# Patient Record
(demographics unavailable — no encounter records)

---

## 2024-12-07 NOTE — HISTORY OF PRESENT ILLNESS
[FreeTextEntry1] : Dec 06, 2024    in person.    Her son has DM1, is onc  fellow at [place], [place]    Next Prolia Jan 27, 2025 at [place].    PCP: Dr. Yolanda Briceño at [facility] -> echocardiogram               GI: Dr. Felipa Millan (colon polyp)              Hematology:   Dr. Thomas Newsome  - [facility] in [place]               Periodontist:  Dr. Trena Valles - [address]  ref her to ->              ENT at [facility] - was supposed to see Dr. Fracisco Lopez  before pandemic began for oral pain            .            CC: Osteopenia w/ presumptive rib fracture;  X-ray [place] 2020: negative rib films  (spontaneous)                          hx of Actonel in past for ~ 5 years                Prolia #1  Jan 25, 2021                   2.5 cm R thyroid nodule - FNAB WPA: benign 2020             (MGUS) 2/2022 [place]   IgM kappa    M spike 0.9 g/dl (4/2021) - sees Dr. Newsome             (scoliosis: X-ray [place] 11/2018 neg compression;  2020 can't r/o L3)             (spinal stenosis)  Receives epidurals at [facility]             (mother had osteoporosis)   76 yo visits for osteoporosis, thyroid nodule and kindly brings in most recent [facility] note and labs from Dr. Mercedez Mcgill advised her A1c slightly elevated. She feels well.    # Thyroid nodule - most recent US at [place] 11/29/22:  "IMPRESSION:   Asymmetrically enlarged heterogeneous thyroid.  Dominant 2.4 cm left-sided TIRAD 3 nodule and additional subcentimeter nodules as above.  Advise follow-up thyroid ultrasound in one year."  Labs by Dr. Newsome were done in June -    Booster for Covid 19 was  Dec 1, 2021.   Received Prolia #1 in Jan and received r Prolia #3 in January   #4  ~8/25/2022   [place] labs 11/28/22 vit D 52   (will have updated vit D today)  : : Constitutional:  Alert, well nourished, healthy appearance, no acute distress  Eyes:  No proptosis, no stare Thyroid: Pulmonary:  No respiratory distress, no accessory muscle use; normal rate and effort Cardiac:  Normal rate Vascular:  Endocrine:  No stigmata of Cushings Syndrome Musculoskeletal:  Normal gait, no involuntary movements Neurology:  No tremors Affect/Mood/Psych:  Oriented x 3; normal affect, normal insight/judgement, normal mood  . Impression:  Doing well on Prolia for osteoporosis. Thyroid nodule remains under surveillance.       Jun 05, 2024    in person   neighbor of Dr. GILBERT  PCP: Dr. Yolanda Briceño at [facility] -> echocardiogram               GI: Dr. Felipa Millan (colon polyp)              Hematology:   Dr. Thomas Newsome  - [facility] in [place]               Periodontist:  Dr. Trena Valles - [address]  ref her to ->              ENT at [facility] - was supposed to see Dr. Fracisco Lopez  before pandemic began for oral pain            .            CC: Osteopenia w/ presumptive rib fracture;  X-ray [facility] 2020: negative rib films  (spontaneous)                          hx of Actonel in past for ~ 5 years                Prolia #1  Jan 25, 2021                   2.5 cm R thyroid nodule - FNAB WPA: benign 2020             (MGUS) 2/2022 [place]   IgM kappa    M spike 0.9 g/dl (4/2021) - sees Dr. Newsome             (scoliosis: X-ray [place] 11/2018 neg compression;  2020 can't r/o L3)             (spinal stenosis)  Receives epidurals at [facility]             (mother had osteoporosis)   76 yo kindly brings note from Dr. Yolanda Briceño from 1/4/2024   # Thyroid nodule -US at [place] 11/29/22:    (7/2024:  Interval stability    "IMPRESSION:   Asymmetrically enlarged heterogeneous thyroid.  Dominant 2.4 cm left-sided TIRAD 3 nodule and additional subcentimeter nodules as above.  Advise follow-up thyroid ultrasound in one year."  Labs by Dr. Newsome were done in June -    Booster for Covid 19 was  Dec 1, 2021.   Received Prolia #1 in Jan and received r Prolia #3 in January   #4  ~8/25/2022   [place] labs 11/28/22 vit D 52  : : Constitutional:  Alert, well nourished, healthy appearance, no acute distress  Eyes:  No proptosis, no stare Thyroid: normal to palpatin Pulmonary:  No respiratory distress, no accessory muscle use; normal rate and effort Cardiac:  Normal rate Vascular:  Endocrine:  No stigmata of Cushings Syndrome Musculoskeletal:  Normal gait, no involuntary movements Neurology:  No tremors Affect/Mood/Psych:  Oriented x 3; normal affect, normal insight/judgement, normal mood  . Imp/Plan: Follow up US thyroid at [place] to monitor dominant nodule. Continue Prola. Labs today. ROV January Jan 05, 2024     in person  PCP: Dr. Yolanda Briceño at [facility]              GI: Dr. Felipa Millan (colon polyp)              Hematology:   Dr. Thomas Newsome  - [facility] in [place]               Periodontist:  Dr. Trena Valles - [address]  ref her to ->              ENT at [facility] - was supposed to see Dr. Fracisco Lopez  before pandemic began for oral pain            .            CC: Osteopenia w/ presumptive rib fracture;  X-ray [place] 2020: negative rib films  (spontaneous)                          hx of Actonel in past for ~ 5 years                Prolia #1  Jan 25, 2021                   2.5 cm R thyroid nodule - FNAB WPA: benign 2020             (MGUS) 2/2022 [place]   IgM kappa    M spike 0.9 g/dl (4/2021) - sees Dr. Newsome             (scoliosis: X-ray [place] 11/2018 neg compression;  2020 can't r/o L3)             (spinal stenosis)  Receives epidurals at [facility]             (mother had osteoporosis)   76 yo kindly brings note from Dr. Yolanda Briceño from 1/4/2024   # Thyroid nodule - most recent US at [place] 11/29/22:  "IMPRESSION:   Asymmetrically enlarged heterogeneous thyroid.  Dominant 2.4 cm left-sided TIRAD 3 nodule and additional subcentimeter nodules as above.  Advise follow-up thyroid ultrasound in one year."  Labs by Dr. Newsome were done in June -    Booster for Covid 19 was  Dec 1, 2021.   Received Prolia #1 in Jan and received r Prolia #3 in January   #4  ~8/25/2022   [place] labs 11/28/22 vit D 52  : : Constitutional:  Alert, well nourished, healthy appearance, no acute distress  Eyes:  No proptosis, no stare Thyroid: Pulmonary:  No respiratory distress, no accessory muscle use; normal rate and effort Cardiac:  Normal rate Vascular:  Endocrine:  No stigmata of Cushings Syndrome Musculoskeletal:  Normal gait, no involuntary movements Neurology:  No tremors Affect/Mood/Psych:  Oriented x 3; normal affect, normal insight/judgement, normal mood  . Imp:  On Prolia for osteoporosis .  Next Prolia Jan 19, 2024  Plan:  Vit D, BMP today         May 22, 2023    in person  PCP: Dr. Yolanda Briceño at [facility]              GI: Dr. Felipa Millan (colon polyp)              Hematology:   Dr. Thomas Newsome  - [facility] in [place]               Periodontist:  Dr. Trena Valles - [address]  ref her to ->              ENT at [facility] - was supposed to see Dr. Fracisco Lopez  before pandemic began for oral pain            .            CC: Osteopenia w/ presumptive rib fracture;  X-ray [facility] 2020: negative rib films  (spontaneous)                          hx of Actonel in past for ~ 5 years                Prolia #1  Jan 25, 2021                   2.5 cm R thyroid nodule - FNAB WPA: benign 2020             (MGUS) 2/2022 [place]   IgM kappa    M spike 0.9 g/dl (4/2021) - sees Dr. Newsome             (scoliosis: X-ray [place] 11/2018 neg compression;  2020 can't r/o L3)             (spinal stenosis)  Receives epidurals at [facility]             (mother had osteoporosis)   # Thyroid nodule - most recent US at [place] 11/29/22:  "IMPRESSION:   Asymmetrically enlarged heterogeneous thyroid.  Dominant 2.4 cm left-sided TIRAD 3 nodule and additional subcentimeter nodules as above.  Advise follow-up thyroid ultrasound in one year."  Labs by Dr. Newsome were done in June -    Booster for Covid 19 was  Dec 1, 2021.   Received Prolia #1 in Jan and received r Prolia #3 in January   #4  ~8/25/2022   [place] labs 11/28/22 vit D 52  Most recent Prolia injection was Nov 29, 2022   : : Constitutional:  Alert, well nourished, healthy appearance, no acute distress  Eyes:  No proptosis, no stare Thyroid:  normal to palpation Pulmonary:  No respiratory distress, no accessory muscle use; normal rate and effort Cardiac:  Normal rate Vascular:  Endocrine:  No stigmata of Cushing's Syndrome Musculoskeletal:  Normal gait, no involuntary movements Neurology:  No tremors Affect/Mood/Psych:  Oriented x 3; normal affect, normal insight/judgement, normal mood  .      Imp:  Due for next Prolia injection end of May, but she will be on trip until June 7, 2024 so she will schedule next Prolia for after June 7 and go for labs shortly before that.       Jan 17, 2023      iPhone     reviewed US thy (prev bx), x-ray spine, bone density, Noe notes MGUS, next Prolia  PCP: Dr. Yolanda Briceño at [facility]              GI: Dr. Felipa Millan (colon polyp)              Hematology:   Dr. Thomas Newsome  - [facility] in [place]               Periodontist:  Dr. Trena Valles - [address]  ref her to ->              ENT at [facility] - was supposed to see Dr. Fracisco Lopez  before pandemic began for oral pain            .            CC: Osteopenia w/ presumptive rib fracture;  X-ray [facility] 2020: negative rib films  (spontaneous)                          hx of Actonel in past for ~ 5 years                Prolia #1  Jan 25, 2021                   2.5 cm R thyroid nodule - FNAB WPA: benign 2020             (MGUS) 2/2022 [facility]   IgM kappa    M spike 0.9 g/dl (4/2021) - sees Dr. Newsome             (scoliosis: X-ray [facility] 11/2018 neg compression;  2020 can't r/o L3)             (spinal stenosis)  Receives epidurals at [facility]             (mother had osteoporosis)   Labs by Dr. Newsome were done in June -    Booster for Covid 19 was  Dec 1, 2021.   Received Prolia #1 in Jan and received r Prolia #3 in January   #4  ~8/25/2022   [facility] labs 11/28/22 vit D 52   Impression:  Tolerating Prolia without problem Bone density results and trajectory reviewed. X-ray: mild exaggeration of the normal thoracic kyphosis related to disc space narrowing.    Plan:  Continue Prolia for now.      Nov 28, 2022     in person  PCP: Dr. Yolanda Briceño at [facility]              GI: Dr. Felipa Millan (colon polyp)              Hematology:   Dr. Thomas Newsome  - [facility] in [place]               Periodontist:  Dr. Trena Valles - [address]  ref her to ->              ENT at [facility] - was supposed to see Dr. Fracisco Lopez  before pandemic began for oral pain            .            CC: Osteopenia w/ presumptive rib fracture;  X-ray [facility] 2020: negative rib films  (spontaneous)                          hx of Actonel in past for ~ 5 years                Prolia #1  Jan 25, 2021                   2.5 cm R thyroid nodule - FNAB WPA: benign 2020             (MGUS) 2/2022 [facility]   IgM kappa    M spike 11.1 %             (scoliosis: X-ray [facility] 11/2018 neg compression;  2020 can't r/o L3)             (spinal stenosis)  Receives epidurals at [facility]             (mother had osteoporosis)   Labs by Dr. Newsome were done in June -    Booster for Covid 19 was  Dec 1, 2021.   Received Prolia #1 in Jan and received r Prolia #3 in January   #4  ~8/25/2022   Sees Dr. Newsome every 3 months for MGUS.  Taking caltrate 600 + D once a day Centrum silver vit D 2000 iu (50 mcg) daily) Prolia every 6 months. - at [place] as ordered by .     Apr 26, 2022     in person  PCP: Dr. Yolanda Briceño at [facility]              GI: Dr. Felipa Millan (colon polyp)              Hematology:   Dr. Thomas Newsome  - [facility] in [place]               Periodontist:  Dr. Trena Valles - [address]  ref her to ->              ENT at [facility] - was supposed to see Dr. Fracisco Lopez  before pandemic began for oral pain            .            CC: Osteopenia w/ presumptive rib fracture;  X-ray [facility] 2020: negative rib films  (spontaneous)                          hx of Actonel in past for ~ 5 years                Prolia #1  Jan 25, 2021                   2.5 cm R thyroid nodule - FNAB WPA: benign 2020             (MGUS) 2/2022 [facility]   IgM kappa    M spike 11.1 %             (scoliosis: X-ray [facility] 11/2018 neg compression;  2020 can't r/o L3)             (spinal stenosis)  Receives epidurals at [facility]             (mother had osteoporosis)   Labs by Dr. Newsome were done in June -    Booster for Covid 19 was  Dec 1, 2021.   Received Prolia #1 in Jan and received r Prolia #3 in January   #4  ~8/25/2022   Sees Dr. Newsome every 3 months forr MGUS.  : : Constitutional:  Alert, well nourished, healthy appearance, no acute distress  Eyes:  No proptosis, no stare Thyroid: Pulmonary:  No respiratory distress, no accessory muscle use; normal rate and effort Cardiac:  Normal rate Vascular:  Endocrine:  No stigmata of Cushing's Syndrome Musculoskeletal:  Normal gait, no involuntary movements Neurology:  No tremors Affect/Mood/Psych:  Oriented x 3; normal affect, normal insight/judgement, normal mood  .  Impression:   MGUS and fragile bones on Actonel and then Prolia since January 2021 under supervision  of Dr. Newsome.   Striving to get 1000 mg of calcium a day and to maintain 25 OH vitamin D level in range of 30-40  Plan:  Updated vitamin D and other labs today and ROV six months       Nov 15, 2021     [place]well                         Prolia #1  Jan 25, 2021      PCP: Dr. Ronald Daniels p [phone] at [facility] ->  Yolanda Briceño at [facility] also             GI: Dr. Felipa Millan (colon polyp)              Hematology:   Dr. Thomas Newsome                Periodontist:  Dr. Trena Valles - [address]  ref her to ->              ENT at [facility] - was supposed to see Dr. Fracisco Lopez  before pandemic began for oral pain            .            CC: Osteopenia w/ presumptive rib fracture;  X-ray [facility] 2020: negative rib films                          hx of Actonel in past for ~ 5 years                Prolia #1  Jan 25, 2021             (MGUS)             (scoliosis: X-ray [facility] 11/2018 neg compression;  2020 can't r/o L3)             (spinal stenosis)  Receives epidurals at [facility]             (mother had osteoporosis)   Labs by Dr. Newsome in June -                  Will get Moderna booster Dec 1, 2021.   Received Prolia #1 in Jan and will be due for Prolia #3 in January July vit D was 35    Impression:  May have had an L3 compression.   On Prolia.  Has tolerated it well.    Plan:  Updated labs when she goes for next Prolia at [place].        Jul 15, 2021        iPhone ...................smo  PCP: Dr. Ronald Daniels p [phone] at [facility] ->  Yolanda Briceño at [facility] also             GI: Dr. Felipa Millan (colon polyp)              Hematology:   Dr. Newsome              .            CC: Osteopenia w/ presumptive rib fracture;  X-ray [facility] 2020: negative rib films                          hx of Actonel in past for ~ 5 years              (scoliosis: X-ray [facility] 11/2018 neg compression;  2020 can't r/o L3)             (spinal stenosis)  Receives epidurals at [facility]             (mother had osteoporosis)   Labs by Dr. Newsome a month ago  Saw Dr. Yolanda Briceño yesterday - advised to lose weight    Impression:  It is time to receive Prolia injection. However, we need updated lab tests, including vitamin D level prior to the treatment.  Plan:  She will check with Dr. Newsome and Dr. Briceño to see if they did the vitamin D level. If not, she will stop by [place] to get it done. ROV November.      Last Prolia Jan 25 at [place]  Plan she will get us BMP, vit D or go to [place] and then I will schedule next Prolai a      Oct 28, 2020    iPhone    needs lab Rx  PCP: Dr. Ronald Daniels p [phone] at [facility] ->  Yolanda Briceño at [facility] also             GI: Dr. Felipa Millan (colon polyp)              Hematology:  Dr. Kaycee Baron            .            CC: Osteopenia w/ presumptive rib fracture;  X-ray [place] 2020: negative rib films                          hx of Actonel in past for ~ 5 years                     (MGUS)             (scoliosis: X-ray [place] 11/2018 neg compression;  2020 can't r/o L3)             (spinal stenosis)  Receives epidurals at [facility]             (mother had osteoporosis)     Recent X-ray  at [place]:  Impression: No acute displaced rib fracture   ***Electronically Signed *** ----------------------------------------------- VIVIEN MUIR MD              07/29/20   X-ray spine:  CLINICAL HISTORY: Osteoporosis, monoclonal gammopathy  FINDINGS:   Levoscoliosis centered on L1 is again apparent. Multilevel degenerative disc disease and productive changes are again apparent including L5-S1 disc space narrowing and mild anterolisthesis. Lateral projection due to the scoliotic deformity is limited in the evaluation of vertebral height. The L3 level cannot be adequately assessed but there decrease in height is suggested. Otherwise the vertebra are maintained in height shape and alignment.  IMPRESSION: Scoliotic deformity and diffuse degenerative changes and discogenic disease again noted. There may be interval loss in height of the L3 vertebral body.  ***Electronically Signed *** ----------------------------------------------- Bryan Hughes MD              10/06/20 0925  Dictated on 10/05/20   Imp/Plan: 71 yo being followed by Dr. Baron - mgus Rib no longer bothers her; 10/2020:  negative rib films   2020 bone density [place] T scores:  LS -0.9;  TH -1.7;  FN -2.3   Carries diagnosis of spinal stenosis, scoliosis Has scoliosis, ? L3 compression - hard to tell b/o the scoliosis - on recent X-ray spine (neg in 2018) Will see Dr. Baron soon  - vitamin D level has been in good range. Gets epidurals at [facility] - last several years ago.     ROV six months.   F/U on L3.    Depending on findings, follow up bone density, GFR, can be considered for an antiresorptive in future.  Jul 14, 2020     PCP: Dr. Ronald Daniels p [phone] at [facility] ->  Yolanda Briceño at [facility] also             GI: Dr. Felipa Millan (colon polyp)              Hematology:  Dr. Kaycee Baron            .            CC: Osteopenia w/ presumptive rib fracture   hx of Actonel in past for ~ 5 years              (scoliosis)             (spinal stenosis)  Receives epidurals at [facility]             (mother had osteoporosis)   Her son, DM1, PGY 2 at [employer].  Doing well but busy.  Her most recent bone density at [facility] at [address] [place] [address]  AP spine T 0 lateral spine T + 2.2 FN T -2.1 TH T -1.6  Impression:  Her usual facility for bone density has retired.  So next BD will be at [place] at very end of September or early October.    In the meantime, I will ask her to have X-ray spine (to check for occult compression fracture), and defer to Dr. Baron regarding any strategies for bone strength while evaluation of monoclonal protein continues.  She still has discomfort where she previously injured a rib, and I will ask her to have rib films when she goes for the spine X-ray, likely on the same day near end of July that she has Hematology follow up visit.  ROV here in October Jan 16, 2020  PCP: Dr. Ronald Daniels p [phone] at [facility] ->  Yolanda Briceño at [facility] also             GI: Dr. Felipa Millan (colon polyp)              Hematology:  Dr. Kaycee Baron            .            CC: Osteopenia w/ rib frx             (scoliosis)             (spinal stenosis)              (mother had osteoporosis)   Going for US thyroid at [facility] per Dr. Briceño.   Seeing Dr. Baron regarding monoclonal protein.  Haematologica. 2018 May; 103(5): 753-754. doi: [email or web address] PMCID: [id number] PMID: [id number] Denosumab for bone lesions in multiple myeloma - what is its value? Rafael Pate       Sept 3, 2019 PCP: Dr. Ronald Daniels p [phone] at [facility]              GI: Dr. Felipa Millan (colon polyp)            .            CC: Osteopenia w/ rib frx             (scoliosis)             (spinal stenosis)              (mother had osteoporosis)   72 yo lives in [place].   A close neighbor is a senior physician advisor.   Her son has Type 1 and is an intern in medicine at [employer].     Impression:  Evidence of MGUS.      Plan:   Before deciding on next steps for treatment of osteoporosis, she will see Hematology at [place]. Contact made and Hematology Department will contact her.    Kanwal Law   April 1, 2019   .            PCP: Dr. Ronald Daniels p [phone]             GI: Dr. Felipa Millan (colon polyp)            .            CC: Osteopenia w/ rib frx             (scoliosis)             (mother had osteoporosis)   In course of evaluation for rib fractures, noted to have osteopenia.   Lab studies show monoclonal IgM kappa.   Beta-2 microglobulin, ESR WNL   Plan:  Repeat labs at [facility] before next visit in Septamber - including markers of bone turnover. Will then ask advice of Hematology.                 .   January 2, 2018  CC:  Osteopenia, rib fracture  24 hour urine for calcium performed at [facility].- within normal limits.  Blood tests from  10/29/2018 performed at [place] included TSH 2.65 PTH 49 gliadin IgG/IgA - neg osteocalcin 19 CTXs  319 IgM Kappa Band + WBC 3.5 Polys 50 Lymphs 32 Monos 17  Hct 38  X-ray spine 11/1/2018 No fracture   Impression:   Rib fracture from minimal trauma; however, no major fractures or compression fractures. Not in a rush to restart pharmacologic intervention.  Plan:  Will follow up on recent labs, repeat CBC etc. ROV in several weeks and likely input from Dr. Baron or Faustino.      Previous notes from [employer] appended below.    October 26, 2018            .            PCP: Dr. Ronald Daniels p [phone]             GI: Dr. Felipa Millan (colon polyp)            .            CC: Osteoporosis             (mother had osteoporosis)             .            69 yo  mother of 29 yo son.            Retired .             Medications include            desloratadine 5 mg            diclofenac topical gel            ipratropium bromide 0.03 % nasal spray            Miralax poser            Nexium 20 mg apsule            resperidone 0.25 mg tabs            sertraline 100 mg tabs            simvastatin 40 mg daiy .            .            Told of PCOS in her 20s when she had painful menses.            Gyn was Dr. Harjeet Petty who asked her what temperature she preferred and she was referred to Dr. Gris Landis and was told of PCOS. She was treated with Motrin with benefit.             .            Has been getting bone density tests by Dr. Gris Landis. She was told of good bone density.            In 2013 the bone density was very good.             .            Recently told of osteopenia; however, she was advised that she is declining and her mother had osteoporosis.             .            She has also been told of spinal stenosis, scoliosis, and arthritis.             Goes for epidurals by Dr. Galloway at [facility] - for back pain about 3very 3 months with benefit. Last was over a year ago.             .            She treats the pain with extra strength Tyelonol, two BID with benefit.            The radiologist who performed the most recent bone density test at [address] [place] [address] noted she [id number] lost 16% over [email or web address] past 10 years.

## 2024-12-07 NOTE — HISTORY OF PRESENT ILLNESS
[FreeTextEntry1] : Dec 06, 2024    in person.    Her son has DM1, is onc  fellow at [place], [place]    Next Prolia Jan 27, 2025 at [place].    PCP: Dr. Yolanda Briceño at [facility] -> echocardiogram               GI: Dr. Felipa Millan (colon polyp)              Hematology:   Dr. Thomas Newsome  - [facility] in [place]               Periodontist:  Dr. Trena Valles - [address]  ref her to ->              ENT at [facility] - was supposed to see Dr. Fracisco Lopez  before pandemic began for oral pain            .            CC: Osteopenia w/ presumptive rib fracture;  X-ray [facility] 2020: negative rib films  (spontaneous)                          hx of Actonel in past for ~ 5 years                Prolia #1  Jan 25, 2021                   2.5 cm R thyroid nodule - FNAB WPA: benign 2020             (MGUS) 2/2022 [place]   IgM kappa    M spike 0.9 g/dl (4/2021) - sees Dr. Newsome             (scoliosis: X-ray [place] 11/2018 neg compression;  2020 can't r/o L3)             (spinal stenosis)  Receives epidurals at [facility]             (mother had osteoporosis)   74 yo visits for osteoporosis, thyroid nodule and kindly brings in most recent [facility] note and labs from Dr. Mercedez Mcgill advised her A1c slightly elevated. She feels well.    # Thyroid nodule - most recent US at [facility] 11/29/22:  "IMPRESSION:   Asymmetrically enlarged heterogeneous thyroid.  Dominant 2.4 cm left-sided TIRAD 3 nodule and additional subcentimeter nodules as above.  Advise follow-up thyroid ultrasound in one year."  Labs by Dr. Newsome were done in June -    Booster for Covid 19 was  Dec 1, 2021.   Received Prolia #1 in Jan and received r Prolia #3 in January   #4  ~8/25/2022   [facility] labs 11/28/22 vit D 52   (will have updated vit D today)  : : Constitutional:  Alert, well nourished, healthy appearance, no acute distress  Eyes:  No proptosis, no stare Thyroid: Pulmonary:  No respiratory distress, no accessory muscle use; normal rate and effort Cardiac:  Normal rate Vascular:  Endocrine:  No stigmata of Cushings Syndrome Musculoskeletal:  Normal gait, no involuntary movements Neurology:  No tremors Affect/Mood/Psych:  Oriented x 3; normal affect, normal insight/judgement, normal mood  . Impression:  Doing well on Prolia for osteoporosis. Thyroid nodule remains under surveillance.       Jun 05, 2024    in person   neighbor of Dr. GILBERT  PCP: Dr. Yolanda Briceño at [facility] -> echocardiogram               GI: Dr. Felipa Millan (colon polyp)              Hematology:   Dr. Thomas Newsome  - [facility] in [place]               Periodontist:  Dr. Trena Valles - [address]  ref her to ->              ENT at [facility] - was supposed to see Dr. Fracisco Lopez  before pandemic began for oral pain            .            CC: Osteopenia w/ presumptive rib fracture;  X-ray [facility] 2020: negative rib films  (spontaneous)                          hx of Actonel in past for ~ 5 years                Prolia #1  Jan 25, 2021                   2.5 cm R thyroid nodule - FNAB WPA: benign 2020             (MGUS) 2/2022 [place]   IgM kappa    M spike 0.9 g/dl (4/2021) - sees Dr. Newsome             (scoliosis: X-ray [facility] 11/2018 neg compression;  2020 can't r/o L3)             (spinal stenosis)  Receives epidurals at [facility]             (mother had osteoporosis)   74 yo kindly brings note from Dr. Yolanda Briceño from 1/4/2024   # Thyroid nodule -US at [place] 11/29/22:    (7/2024:  Interval stability    "IMPRESSION:   Asymmetrically enlarged heterogeneous thyroid.  Dominant 2.4 cm left-sided TIRAD 3 nodule and additional subcentimeter nodules as above.  Advise follow-up thyroid ultrasound in one year."  Labs by Dr. Newsome were done in June -    Booster for Covid 19 was  Dec 1, 2021.   Received Prolia #1 in Jan and received r Prolia #3 in January   #4  ~8/25/2022   [facility] labs 11/28/22 vit D 52  : : Constitutional:  Alert, well nourished, healthy appearance, no acute distress  Eyes:  No proptosis, no stare Thyroid: normal to palpatin Pulmonary:  No respiratory distress, no accessory muscle use; normal rate and effort Cardiac:  Normal rate Vascular:  Endocrine:  No stigmata of Cushings Syndrome Musculoskeletal:  Normal gait, no involuntary movements Neurology:  No tremors Affect/Mood/Psych:  Oriented x 3; normal affect, normal insight/judgement, normal mood  . Imp/Plan: Follow up US thyroid at [place] to monitor dominant nodule. Continue Prola. Labs today. ROV January Jan 05, 2024     in person  PCP: Dr. Yolanda Briceño at [facility]              GI: Dr. Felipa Millan (colon polyp)              Hematology:   Dr. Thomas Newsome  - [facility] in [place]               Periodontist:  Dr. Trena Valles - [address]  ref her to ->              ENT at [facility] - was supposed to see Dr. Fracisco Lopez  before pandemic began for oral pain            .            CC: Osteopenia w/ presumptive rib fracture;  X-ray [facility] 2020: negative rib films  (spontaneous)                          hx of Actonel in past for ~ 5 years                Prolia #1  Jan 25, 2021                   2.5 cm R thyroid nodule - FNAB WPA: benign 2020             (MGUS) 2/2022 [place]   IgM kappa    M spike 0.9 g/dl (4/2021) - sees Dr. Newsome             (scoliosis: X-ray [facility] 11/2018 neg compression;  2020 can't r/o L3)             (spinal stenosis)  Receives epidurals at [facility]             (mother had osteoporosis)   74 yo kindly brings note from Dr. Yolanda Briceño from 1/4/2024   # Thyroid nodule - most recent US at [place] 11/29/22:  "IMPRESSION:   Asymmetrically enlarged heterogeneous thyroid.  Dominant 2.4 cm left-sided TIRAD 3 nodule and additional subcentimeter nodules as above.  Advise follow-up thyroid ultrasound in one year."  Labs by Dr. Newsome were done in June -    Booster for Covid 19 was  Dec 1, 2021.   Received Prolia #1 in Jan and received r Prolia #3 in January   #4  ~8/25/2022   [facility] labs 11/28/22 vit D 52  : : Constitutional:  Alert, well nourished, healthy appearance, no acute distress  Eyes:  No proptosis, no stare Thyroid: Pulmonary:  No respiratory distress, no accessory muscle use; normal rate and effort Cardiac:  Normal rate Vascular:  Endocrine:  No stigmata of Cushings Syndrome Musculoskeletal:  Normal gait, no involuntary movements Neurology:  No tremors Affect/Mood/Psych:  Oriented x 3; normal affect, normal insight/judgement, normal mood  . Imp:  On Prolia for osteoporosis .  Next Prolia Jan 19, 2024  Plan:  Vit D, BMP today         May 22, 2023    in person  PCP: Dr. Yolanda Briceño at [facility]              GI: Dr. Felipa Millan (colon polyp)              Hematology:   Dr. Thomas Newsome  - [facility] in [place]               Periodontist:  Dr. Trena Valles - [address]  ref her to ->              ENT at [facility] - was supposed to see Dr. Fracisco Lopez  before pandemic began for oral pain            .            CC: Osteopenia w/ presumptive rib fracture;  X-ray [facility] 2020: negative rib films  (spontaneous)                          hx of Actonel in past for ~ 5 years                Prolia #1  Jan 25, 2021                   2.5 cm R thyroid nodule - FNAB WPA: benign 2020             (MGUS) 2/2022 [place]   IgM kappa    M spike 0.9 g/dl (4/2021) - sees Dr. Newsome             (scoliosis: X-ray [facility] 11/2018 neg compression;  2020 can't r/o L3)             (spinal stenosis)  Receives epidurals at [facility]             (mother had osteoporosis)   # Thyroid nodule - most recent US at [facility] 11/29/22:  "IMPRESSION:   Asymmetrically enlarged heterogeneous thyroid.  Dominant 2.4 cm left-sided TIRAD 3 nodule and additional subcentimeter nodules as above.  Advise follow-up thyroid ultrasound in one year."  Labs by Dr. Newsome were done in June -    Booster for Covid 19 was  Dec 1, 2021.   Received Prolia #1 in Jan and received r Prolia #3 in January   #4  ~8/25/2022   [facility] labs 11/28/22 vit D 52  Most recent Prolia injection was Nov 29, 2022   : : Constitutional:  Alert, well nourished, healthy appearance, no acute distress  Eyes:  No proptosis, no stare Thyroid:  normal to palpation Pulmonary:  No respiratory distress, no accessory muscle use; normal rate and effort Cardiac:  Normal rate Vascular:  Endocrine:  No stigmata of Cushing's Syndrome Musculoskeletal:  Normal gait, no involuntary movements Neurology:  No tremors Affect/Mood/Psych:  Oriented x 3; normal affect, normal insight/judgement, normal mood  .      Imp:  Due for next Prolia injection end of May, but she will be on trip until June 7, 2024 so she will schedule next Prolia for after June 7 and go for labs shortly before that.       Jan 17, 2023      iPhone     reviewed US thy (prev bx), x-ray spine, bone density, Noe notes MGUS, next Prolia  PCP: Dr. Yolanda Briceño at [facility]              GI: Dr. Felipa Millan (colon polyp)              Hematology:   Dr. Thomas Newsome  - [facility] in [place]               Periodontist:  Dr. Trena Valles - [address]  ref her to ->              ENT at [facility] - was supposed to see Dr. Fracisco Lopez  before pandemic began for oral pain            .            CC: Osteopenia w/ presumptive rib fracture;  X-ray [facility] 2020: negative rib films  (spontaneous)                          hx of Actonel in past for ~ 5 years                Prolia #1  Jan 25, 2021                   2.5 cm R thyroid nodule - FNAB WPA: benign 2020             (MGUS) 2/2022 [facility]   IgM kappa    M spike 0.9 g/dl (4/2021) - sees Dr. Newsome             (scoliosis: X-ray [facility] 11/2018 neg compression;  2020 can't r/o L3)             (spinal stenosis)  Receives epidurals at [facility]             (mother had osteoporosis)   Labs by Dr. Newsome were done in June -    Booster for Covid 19 was  Dec 1, 2021.   Received Prolia #1 in Jan and received r Prolia #3 in January   #4  ~8/25/2022   [facility] labs 11/28/22 vit D 52   Impression:  Tolerating Prolia without problem Bone density results and trajectory reviewed. X-ray: mild exaggeration of the normal thoracic kyphosis related to disc space narrowing.    Plan:  Continue Prolia for now.      Nov 28, 2022     in person  PCP: Dr. Yolanda Briceño at [facility]              GI: Dr. Felipa Millan (colon polyp)              Hematology:   Dr. Thomas Newsome  - [facility] in [place]               Periodontist:  Dr. Trena Valles - [address]  ref her to ->              ENT at [facility] - was supposed to see Dr. Fracisco Lopez  before pandemic began for oral pain            .            CC: Osteopenia w/ presumptive rib fracture;  X-ray [facility] 2020: negative rib films  (spontaneous)                          hx of Actonel in past for ~ 5 years                Prolia #1  Jan 25, 2021                   2.5 cm R thyroid nodule - FNAB WPA: benign 2020             (MGUS) 2/2022 [facility]   IgM kappa    M spike 11.1 %             (scoliosis: X-ray [facility] 11/2018 neg compression;  2020 can't r/o L3)             (spinal stenosis)  Receives epidurals at [facility]             (mother had osteoporosis)   Labs by Dr. Newsome were done in June -    Booster for Covid 19 was  Dec 1, 2021.   Received Prolia #1 in Jan and received r Prolia #3 in January   #4  ~8/25/2022   Sees Dr. Newsome every 3 months for MGUS.  Taking caltrate 600 + D once a day Centrum silver vit D 2000 iu (50 mcg) daily) Prolia every 6 months. - at [facility] as ordered by .     Apr 26, 2022     in person  PCP: Dr. Yolanda Briceño at [facility]              GI: Dr. Felipa Millan (colon polyp)              Hematology:   Dr. Thomas Newsome  - [facility] in [place]               Periodontist:  Dr. Trena Valles - [address]  ref her to ->              ENT at [facility] - was supposed to see Dr. Fracisco Lopez  before pandemic began for oral pain            .            CC: Osteopenia w/ presumptive rib fracture;  X-ray [facility] 2020: negative rib films  (spontaneous)                          hx of Actonel in past for ~ 5 years                Prolia #1  Jan 25, 2021                   2.5 cm R thyroid nodule - FNAB WPA: benign 2020             (MGUS) 2/2022 [facility]   IgM kappa    M spike 11.1 %             (scoliosis: X-ray [facility] 11/2018 neg compression;  2020 can't r/o L3)             (spinal stenosis)  Receives epidurals at [facility]             (mother had osteoporosis)   Labs by Dr. Newsome were done in June -    Booster for Covid 19 was  Dec 1, 2021.   Received Prolia #1 in Jan and received r Prolia #3 in January   #4  ~8/25/2022   Sees Dr. Newsome every 3 months forr MGUS.  : : Constitutional:  Alert, well nourished, healthy appearance, no acute distress  Eyes:  No proptosis, no stare Thyroid: Pulmonary:  No respiratory distress, no accessory muscle use; normal rate and effort Cardiac:  Normal rate Vascular:  Endocrine:  No stigmata of Cushing's Syndrome Musculoskeletal:  Normal gait, no involuntary movements Neurology:  No tremors Affect/Mood/Psych:  Oriented x 3; normal affect, normal insight/judgement, normal mood  .  Impression:   MGUS and fragile bones on Actonel and then Prolia since January 2021 under supervision  of Dr. Newsome.   Striving to get 1000 mg of calcium a day and to maintain 25 OH vitamin D level in range of 30-40  Plan:  Updated vitamin D and other labs today and ROV six months       Nov 15, 2021     [place]                         Prolia #1  Jan 25, 2021      PCP: Dr. Ronald Daniels p [phone] at [facility] ->  Yolanda Briceño at [facility] also             GI: Dr. Felipa Millan (colon polyp)              Hematology:   Dr. Thomas Newsome                Periodontist:  Dr. Trena Valles - [address]  ref her to ->              ENT at [facility] - was supposed to see Dr. Fracisco Lopez  before pandemic began for oral pain            .            CC: Osteopenia w/ presumptive rib fracture;  X-ray [facility] 2020: negative rib films                          hx of Actonel in past for ~ 5 years                Prolia #1  Jan 25, 2021             (MGUS)             (scoliosis: X-ray [facility] 11/2018 neg compression;  2020 can't r/o L3)             (spinal stenosis)  Receives epidurals at [facility]             (mother had osteoporosis)   Labs by Dr. Newsome in June -                  Will get Moderna booster Dec 1, 2021.   Received Prolia #1 in Jan and will be due for Prolia #3 in January July vit D was 35    Impression:  May have had an L3 compression.   On Prolia.  Has tolerated it well.    Plan:  Updated labs when she goes for next Prolia at [place].        Jul 15, 2021        iPhone ...................smo  PCP: Dr. Ronald Daniels p [phone] at [facility] ->  Yolanda Briceño at [facility] also             GI: Dr. Felipa Millan (colon polyp)              Hematology:   Dr. Newsome              .            CC: Osteopenia w/ presumptive rib fracture;  X-ray [facility] 2020: negative rib films                          hx of Actonel in past for ~ 5 years              (scoliosis: X-ray [facility] 11/2018 neg compression;  2020 can't r/o L3)             (spinal stenosis)  Receives epidurals at [facility]             (mother had osteoporosis)   Labs by Dr. Newsome a month ago  Saw Dr. Yolanda Briceño yesterday - advised to lose weight    Impression:  It is time to receive Prolia injection. However, we need updated lab tests, including vitamin D level prior to the treatment.  Plan:  She will check with Dr. Newsome and Dr. Briceño to see if they did the vitamin D level. If not, she will stop by [facility] to get it done. ROV November.      Last Prolia Jan 25 at [place]  Plan she will get us BMP, vit D or go to [place] and then I will schedule next Prolai a      Oct 28, 2020    iPhone    needs lab Rx  PCP: Dr. Ronald Daniels p [phone] at [facility] ->  Yolanda Briceño at [facility] also             GI: Dr. Felipa Millan (colon polyp)              Hematology:  Dr. Kaycee Baron            .            CC: Osteopenia w/ presumptive rib fracture;  X-ray [facility] 2020: negative rib films                          hx of Actonel in past for ~ 5 years                     (MGUS)             (scoliosis: X-ray [facility] 11/2018 neg compression;  2020 can't r/o L3)             (spinal stenosis)  Receives epidurals at [facility]             (mother had osteoporosis)     Recent X-ray  at [place]:  Impression: No acute displaced rib fracture   ***Electronically Signed *** ----------------------------------------------- VIVIEN MUIR MD              07/29/20   X-ray spine:  CLINICAL HISTORY: Osteoporosis, monoclonal gammopathy  FINDINGS:   Levoscoliosis centered on L1 is again apparent. Multilevel degenerative disc disease and productive changes are again apparent including L5-S1 disc space narrowing and mild anterolisthesis. Lateral projection due to the scoliotic deformity is limited in the evaluation of vertebral height. The L3 level cannot be adequately assessed but there decrease in height is suggested. Otherwise the vertebra are maintained in height shape and alignment.  IMPRESSION: Scoliotic deformity and diffuse degenerative changes and discogenic disease again noted. There may be interval loss in height of the L3 vertebral body.  ***Electronically Signed *** ----------------------------------------------- Bryan Hughes MD              10/06/20 0925  Dictated on 10/05/20   Imp/Plan: 71 yo being followed by Dr. Baron - mgus Rib no longer bothers her; 10/2020:  negative rib films   2020 bone density [facility] T scores:  LS -0.9;  TH -1.7;  FN -2.3   Carries diagnosis of spinal stenosis, scoliosis Has scoliosis, ? L3 compression - hard to tell b/o the scoliosis - on recent X-ray spine (neg in 2018) Will see Dr. Baron soon  - vitamin D level has been in good range. Gets epidurals at [facility] - last several years ago.     ROV six months.   F/U on L3.    Depending on findings, follow up bone density, GFR, can be considered for an antiresorptive in future.  Jul 14, 2020     PCP: Dr. Ronald Daniels p [phone] at [facility] ->  Yolanda Briceño at [facility] also             GI: Dr. Felipa Millan (colon polyp)              Hematology:  Dr. Kaycee Baron            .            CC: Osteopenia w/ presumptive rib fracture   hx of Actonel in past for ~ 5 years              (scoliosis)             (spinal stenosis)  Receives epidurals at [facility]             (mother had osteoporosis)   Her son, DM1, PGY 2 at [employer].  Doing well but busy.  Her most recent bone density at [facility] at [address] [place] [address]  AP spine T 0 lateral spine T + 2.2 FN T -2.1 TH T -1.6  Impression:  Her usual facility for bone density has retired.  So next BD will be at [facility] at very end of September or early October.    In the meantime, I will ask her to have X-ray spine (to check for occult compression fracture), and defer to Dr. Baron regarding any strategies for bone strength while evaluation of monoclonal protein continues.  She still has discomfort where she previously injured a rib, and I will ask her to have rib films when she goes for the spine X-ray, likely on the same day near end of July that she has Hematology follow up visit.  ROV here in October Jan 16, 2020  PCP: Dr. Ronald Daniels p [phone] at [facility] ->  Yolanda Briceño at [facility] also             GI: Dr. Felipa Millan (colon polyp)              Hematology:  Dr. Kaycee Baron            .            CC: Osteopenia w/ rib frx             (scoliosis)             (spinal stenosis)              (mother had osteoporosis)   Going for US thyroid at [facility] per Dr. Briceño.   Seeing Dr. Baron regarding monoclonal protein.  Haematologica. 2018 May; 103(5): 753-754. doi: 10.3324/[email or web address] PMCID: [id number] PMID: [id number] Denosumab for bone lesions in multiple myeloma - what is its value? Rafael Pate       Sept 3, 2019 PCP: Dr. Ronald Daniels p [phone] at [facility]              GI: Dr. Felipa Millan (colon polyp)            .            CC: Osteopenia w/ rib frx             (scoliosis)             (spinal stenosis)              (mother had osteoporosis)   70 yo lives in [place].   A close neighbor is a senior physician advisor.   Her son has Type 1 and is an intern in medicine at [employer].     Impression:  Evidence of MGUS.      Plan:   Before deciding on next steps for treatment of osteoporosis, she will see Hematology at [facility]. Contact made and Hematology Department will contact her.    Kanwal Law   April 1, 2019   .            PCP: Dr. Ronald Daniels p [phone]             GI: Dr. Felipa Millan (colon polyp)            .            CC: Osteopenia w/ rib frx             (scoliosis)             (mother had osteoporosis)   In course of evaluation for rib fractures, noted to have osteopenia.   Lab studies show monoclonal IgM kappa.   Beta-2 microglobulin, ESR WNL   Plan:  Repeat labs at [facility] before next visit in [place] - including markers of bone turnover. Will then ask advice of Hematology.                 .   January 2, 2018  CC:  Osteopenia, rib fracture  24 hour urine for calcium performed at [facility].- within normal limits.  Blood tests from  10/29/2018 performed at [facility] included TSH 2.65 PTH 49 gliadin IgG/IgA - neg osteocalcin 19 CTXs  319 IgM Kappa Band + WBC 3.5 Polys 50 Lymphs 32 Monos 17  Hct 38  X-ray spine 11/1/2018 No fracture   Impression:   Rib fracture from minimal trauma; however, no major fractures or compression fractures. Not in a rush to restart pharmacologic intervention.  Plan:  Will follow up on recent labs, repeat CBC etc. ROV in several weeks and likely input from Dr. Baron or Faustino.      Previous notes from [employer] appended below.    October 26, 2018            .            PCP: Dr. Ronald Daniels p [phone]             GI: Dr. Felipa Millan (colon polyp)            .            CC: Osteoporosis             (mother had osteoporosis)             .            69 yo  mother of 29 yo son.            Retired .             Medications include            desloratadine 5 mg            diclofenac topical gel            ipratropium bromide 0.03 % nasal spray            Miralax poser            Nexium 20 mg apsule            resperidone 0.25 mg tabs            sertraline 100 mg tabs            simvastatin 40 mg daiy .            .            Told of PCOS in her 20s when she had painful menses.            Gyn was Dr. Harjeet Petty who asked her what temperature she preferred and she was referred to Dr. Gris Landis and was told of PCOS. She was treated with Motrin with benefit.             .            Has been getting bone density tests by Dr. Gris Landis. She was told of good bone density.            In 2013 the bone density was very good.             .            Recently told of osteopenia; however, she was advised that she is declining and her mother had osteoporosis.             .            She has also been told of spinal stenosis, scoliosis, and arthritis.             Goes for epidurals by Dr. Galloway at [facility] - for back pain about 3very 3 months with benefit. Last was over a year ago.             .            She treats the pain with extra strength Tyelonol, two BID with benefit.            The radiologist who performed the most recent bone density test at [address] [place] [id number] noted she hasd lost 16% over the past 10 years.

## 2025-05-08 NOTE — HISTORY OF PRESENT ILLNESS
[FreeTextEntry1] : May 07, 2025    in person        PCP: Dr. Yolanda Briceño at [facility] -> echocardiogram               GI: Dr. Felipa Millan (colon polyp)              Hematology:   Dr. Thomas Newsome  - [facility] [place]               Periodontist:  Dr. Trena Valles - [address]  ref her to ->              ENT at [facility] - was supposed to see Dr. Fracisco Lopez  before pandemic began for oral pain            .            CC: Osteopenia w/ presumptive rib fracture;  X-ray [facility] 2020: negative rib films  (spontaneous)                          hx of Actonel in past for ~ 5 years                Prolia #1  Jan 25, 2021                   2.5 cm R thyroid nodule - FNAB WPA: benign 2020             (MGUS) 2/2022 [facility]   IgM kappa    M spike 0.9 g/dl (4/2021) - sees Dr. Newsome             (scoliosis: X-ray [facility] 11/2018 neg compression;  2020 can't r/o L3)             (spinal stenosis)  Receives epidurals at [facility]             (mother had osteoporosis)   78 yo  neighbor of Dr. Farmer, visits for osteoporosis, thyroid nodule and previously brought in  [facility] [place] labs from Dr. Newsome (whom she sees for MGUS.   She has previously been  Dr. cMgill advised her A1c slightly elevated. She feels well.     She reports that her oral surgeon, Isaias Diggs performed a root canal and anticipates extraction of a tooth on July 28, 2025  He has been to Dr. Law for evaluation of a chronic cough, was told of a senstivie throat and tried on dietary alterations.  Now seeing Morenita Davis at [facility] and referred to a speech therapist whom she will see tomorrow.  She has been noting shoulder pain, especially on the left, and is seein a physiatrist at [facility].  In the past cortisone injections helped, but apparently she is not eligible for another inejction - so she has been referred to PT   After a mammogram and biopsy she has been undregoing further evaluation and surveillance and has an appointment to see an NP at [facility].  In January 2025 she feel on her face - no fractures 3/15/2025 T scores:  FN -2.1;  TH -1.1, LS -1.4 and FA -2.4 She is due for next Prolia in the near future (and US thyroid)    # Thyroid nodule - most recent US at [facility] 11/29/22:  "IMPRESSION:   Asymmetrically enlarged heterogeneous thyroid.  Dominant 2.4 cm left-sided TIRAD 3 nodule and additional subcentimeter nodules as above.  Advise follow-up thyroid ultrasound in one year."  Labs by Dr. Newsome were done in June -    Booster for Covid 19 was  Dec 1, 2021.   Received Prolia #1 in Jan and received r Prolia #3 in January   #4  ~8/25/2022   On May 7, 2025 Rx for updated thyroid US requested.   : : Constitutional:  Alert, well nourished, healthy appearance, no acute distress  Eyes:  No proptosis, no stare Thyroid: Pulmonary:  No respiratory distress, no accessory muscle use; normal rate and effort Cardiac:  Normal rate Vascular:  Endocrine:  No stigmata of Cushings Syndrome Musculoskeletal:  Normal gait, no involuntary movements Neurology:  No tremors Affect/Mood/Psych:  Oriented x 3; normal affect, normal insight/judgement, normal mood  . Impression/Plan:  As above - she has alot of medical issues to attend to.      Dec 06, 2024    in person.    Her son has DM1, is onc  fellow at [place], [place]    Next Prolia Jan 27, 2025 at [facility].    PCP: Dr. Yolanda Briceño at [facility] -> echocardiogram               GI: Dr. Felipa Millan (colon polyp)              Hematology:   Dr. Thomas Newsome  - [facility] [place]               Periodontist:  Dr. Trena Valles - [address]  ref her to ->              ENT at [facility] - was supposed to see Dr. Fracisco Lopez  before pandemic began for oral pain            .            CC: Osteopenia w/ presumptive rib fracture;  X-ray [facility] 2020: negative rib films  (spontaneous)                          hx of Actonel in past for ~ 5 years                Prolia #1  Jan 25, 2021                   2.5 cm R thyroid nodule - FNAB WPA: benign 2020             (MGUS) 2/2022 [facility]   IgM kappa    M spike 0.9 g/dl (4/2021) - sees Dr. Newsome             (scoliosis: X-ray [facility] 11/2018 neg compression;  2020 can't r/o L3)             (spinal stenosis)  Receives epidurals at [facility]             (mother had osteoporosis)   78 yo visits for osteoporosis (Prolia # 1 in January 2021) , thyroid nodule and kindly brings in most recent [facility] [place] and labs from Dr. Mercedez Mcgill advised her A1c slightly elevated. She feels well.    # Thyroid nodule - most recent US at [facility] 11/29/22:  "IMPRESSION:   Asymmetrically enlarged heterogeneous thyroid.  Dominant 2.4 cm left-sided TIRAD 3 nodule and additional subcentimeter nodules as above.  Advise follow-up thyroid ultrasound in one year."  Labs by Dr. Newsome were done in June -    Booster for Covid 19 was  Dec 1, 2021.   Received Prolia #1 in Jan and received r Prolia #3 in January   #4  ~8/25/2022      : : Constitutional:  Alert, well nourished, healthy appearance, no acute distress  Eyes:  No proptosis, no stare Thyroid: Pulmonary:  No respiratory distress, no accessory muscle use; normal rate and effort Cardiac:  Normal rate Vascular:  Endocrine:  No stigmata of Cushings Syndrome Musculoskeletal:  Normal gait, no involuntary movements Neurology:  No tremors Affect/Mood/Psych:  Oriented x 3; normal affect, normal insight/judgement, normal mood  . Impression:  Doing well on Prolia for osteoporosis. Thyroid nodule remains under surveillance.       Jun 05, 2024    in person   neighbor of Dr. GILBERT  PCP: Dr. Yolanda Briceño at [facility] -> echocardiogram               GI: Dr. Felipa Millan (colon polyp)              Hematology:   Dr. Thomas Newsome  - [facility] [place]               Periodontist:  Dr. Trena Valles - [address]  ref her to ->              ENT at [facility] - was supposed to see Dr. Fracisco Lopez  before pandemic began for oral pain            .            CC: Osteopenia w/ presumptive rib fracture;  X-ray [facility] 2020: negative rib films  (spontaneous)                          hx of Actonel in past for ~ 5 years                Prolia #1  Jan 25, 2021                   2.5 cm R thyroid nodule - FNAB WPA: benign 2020             (MGUS) 2/2022 [facility]   IgM kappa    M spike 0.9 g/dl (4/2021) - sees Dr. Newsome             (scoliosis: X-ray [facility] 11/2018 neg compression;  2020 can't r/o L3)             (spinal stenosis)  Receives epidurals at [facility]             (mother had osteoporosis)   76 yo kindly brings note from Dr. Yolanda Briceño from 1/4/2024   # Thyroid nodule -US at [facility] 11/29/22:    (7/2024:  Interval stability    "IMPRESSION:   Asymmetrically enlarged heterogeneous thyroid.  Dominant 2.4 cm left-sided TIRAD 3 nodule and additional subcentimeter nodules as above.  Advise follow-up thyroid ultrasound in one year."  Labs by Dr. Newsome were done in June -    Booster for Covid 19 was  Dec 1, 2021.   Received Prolia #1 in Jan and received r Prolia #3 in January   #4  ~8/25/2022   [facility] labs 11/28/22 vit D 52  : : Constitutional:  Alert, well nourished, healthy appearance, no acute distress  Eyes:  No proptosis, no stare Thyroid: normal to palpatin Pulmonary:  No respiratory distress, no accessory muscle use; normal rate and effort Cardiac:  Normal rate Vascular:  Endocrine:  No stigmata of Cushings Syndrome Musculoskeletal:  Normal gait, no involuntary movements Neurology:  No tremors Affect/Mood/Psych:  Oriented x 3; normal affect, normal insight/judgement, normal mood  . Imp/Plan: Follow up US thyroid at [facility] to monitor dominant nodule. Continue Prola. Labs today. ROV January Jan 05, 2024     in person  PCP: Dr. Yolanda Briceño at [facility]              GI: Dr. Felipa Millan (colon polyp)              Hematology:   Dr. Thomas Newsome  - [facility] [place]               Periodontist:  Dr. Trena Valles - [address]  ref her to ->              ENT at [facility] - was supposed to see Dr. Fracisco Lopez  before pandemic began for oral pain            .            CC: Osteopenia w/ presumptive rib fracture;  X-ray [facility] 2020: negative rib films  (spontaneous)                          hx of Actonel in past for ~ 5 years                Prolia #1  Jan 25, 2021                   2.5 cm R thyroid nodule - FNAB WPA: benign 2020             (MGUS) 2/2022 [facility]   IgM kappa    M spike 0.9 g/dl (4/2021) - sees Dr. Newsome             (scoliosis: X-ray [facility] 11/2018 neg compression;  2020 can't r/o L3)             (spinal stenosis)  Receives epidurals at [facility]             (mother had osteoporosis)   76 yo kindly brings note from Dr. Yolanda Briceño from 1/4/2024   # Thyroid nodule - most recent US at [facility] 11/29/22:  "IMPRESSION:   Asymmetrically enlarged heterogeneous thyroid.  Dominant 2.4 cm left-sided TIRAD 3 nodule and additional subcentimeter nodules as above.  Advise follow-up thyroid ultrasound in one year."  Labs by Dr. Newsome were done in June -    Booster for Covid 19 was  Dec 1, 2021.   Received Prolia #1 in Jan and received r Prolia #3 in January   #4  ~8/25/2022   [facility] labs 11/28/22 vit D 52  : : Constitutional:  Alert, well nourished, healthy appearance, no acute distress  Eyes:  No proptosis, no stare Thyroid: Pulmonary:  No respiratory distress, no accessory muscle use; normal rate and effort Cardiac:  Normal rate Vascular:  Endocrine:  No stigmata of Cushings Syndrome Musculoskeletal:  Normal gait, no involuntary movements Neurology:  No tremors Affect/Mood/Psych:  Oriented x 3; normal affect, normal insight/judgement, normal mood  . Imp:  On Prolia for osteoporosis .  Next Prolia Jan 19, 2024  Plan:  Vit D, BMP today         May 22, 2023    in person  PCP: Dr. Yolanda Briecño at [facility]              GI: Dr. Felipa Millan (colon polyp)              Hematology:   Dr. Thomas Newsome  - [facility] [place]               Periodontist:  Dr. Trena Valles - [address]  ref her to ->              ENT at [facility] - was supposed to see Dr. Fracisco Lopez  before pandemic began for oral pain            .            CC: Osteopenia w/ presumptive rib fracture;  X-ray [facility] 2020: negative rib films  (spontaneous)                          hx of Actonel in past for ~ 5 years                Prolia #1  Jan 25, 2021                   2.5 cm R thyroid nodule - FNAB WPA: benign 2020             (MGUS) 2/2022 [facility]   IgM kappa    M spike 0.9 g/dl (4/2021) - sees Dr. Newsome             (scoliosis: X-ray [facility] 11/2018 neg compression;  2020 can't r/o L3)             (spinal stenosis)  Receives epidurals at [facility]             (mother had osteoporosis)   # Thyroid nodule - most recent US at [facility] 11/29/22:  "IMPRESSION:   Asymmetrically enlarged heterogeneous thyroid.  Dominant 2.4 cm left-sided TIRAD 3 nodule and additional subcentimeter nodules as above.  Advise follow-up thyroid ultrasound in one year."  Labs by Dr. Newsome were done in June -    Booster for Covid 19 was  Dec 1, 2021.   Received Prolia #1 in Jan and received r Prolia #3 in January   #4  ~8/25/2022   [facility] labs 11/28/22 vit D 52  Most recent Prolia injection was Nov 29, 2022   : : Constitutional:  Alert, well nourished, healthy appearance, no acute distress  Eyes:  No proptosis, no stare Thyroid:  normal to palpation Pulmonary:  No respiratory distress, no accessory muscle use; normal rate and effort Cardiac:  Normal rate Vascular:  Endocrine:  No stigmata of Cushing's Syndrome Musculoskeletal:  Normal gait, no involuntary movements Neurology:  No tremors Affect/Mood/Psych:  Oriented x 3; normal affect, normal insight/judgement, normal mood  .      Imp:  Due for next Prolia injection end of May, but she will be on trip until June 7, 2024 so she will schedule next Prolia for after June 7 and go for labs shortly before that.       Jan 17, 2023      iPhone     reviewed US thy (prev bx), x-ray spine, bone density, Noe notes MGUS, next Prolia  PCP: Dr. Yolanda Briceño at [facility]              GI: Dr. Felipa Millan (colon polyp)              Hematology:   Dr. Thomas Newsome  - [facility] [place]               Periodontist:  Dr. Trena Valles - [address]  ref her to ->              ENT at [facility] - was supposed to see Dr. Fracisco Lopez  before pandemic began for oral pain            .            CC: Osteopenia w/ presumptive rib fracture;  X-ray [facility] 2020: negative rib films  (spontaneous)                          hx of Actonel in past for ~ 5 years                Prolia #1  Jan 25, 2021                   2.5 cm R thyroid nodule - FNAB WPA: benign 2020             (MGUS) 2/2022 [facility]   IgM kappa    M spike 0.9 g/dl (4/2021) - sees Dr. Newsome             (scoliosis: X-ray [facility] 11/2018 neg compression;  2020 can't r/o L3)             (spinal stenosis)  Receives epidurals at [facility]             (mother had osteoporosis)   Labs by Dr. Newsome were done in June -    Booster for Covid 19 was  Dec 1, 2021.   Received Prolia #1 in Jan and received r Prolia #3 in January   #4  ~8/25/2022   [facility] labs 11/28/22 vit D 52   Impression:  Tolerating Prolia without problem Bone density results and trajectory reviewed. X-ray: mild exaggeration of the normal thoracic kyphosis related to disc space narrowing.    Plan:  Continue Prolia for now.      Nov 28, 2022     in person  PCP: Dr. Yolanda Briceño at [facility]              GI: Dr. Felipa Millan (colon polyp)              Hematology:   Dr. Thomas Newsome  - [facility] [place]               Periodontist:  Dr. Trena Valles - [address]  ref her to ->              ENT at [facility] - was supposed to see Dr. Fracisco Lopez  before pandemic began for oral pain            .            CC: Osteopenia w/ presumptive rib fracture;  X-ray [facility] 2020: negative rib films  (spontaneous)                          hx of Actonel in past for ~ 5 years                Prolia #1  Jan 25, 2021                   2.5 cm R thyroid nodule - FNAB WPA: benign 2020             (MGUS) 2/2022 [facility]   IgM kappa    M spike 11.1 %             (scoliosis: X-ray [facility] 11/2018 neg compression;  2020 can't r/o L3)             (spinal stenosis)  Receives epidurals at [facility]             (mother had osteoporosis)   Labs by Dr. Newsome were done in June -    Booster for Covid 19 was  Dec 1, 2021.   Received Prolia #1 in Jan and received r Prolia #3 in January   #4  ~8/25/2022   Sees Dr. Newsome every 3 months for MGUS.  Taking caltrate 600 + D once a day Centrum silver vit D 2000 iu (50 mcg) daily) Prolia every 6 months. - at [facility] as ordered by .     Apr 26, 2022     in person  PCP: Dr. Yolanda Briceño at [facility]              GI: Dr. Felipa Millan (colon polyp)              Hematology:   Dr. Thomas Newsome  - [facility] [place]               Periodontist:  Dr. Trena Valles - [address]  ref her to ->              ENT at [facility] - was supposed to see Dr. Fracisco Lopez  before pandemic began for oral pain            .            CC: Osteopenia w/ presumptive rib fracture;  X-ray [facility] 2020: negative rib films  (spontaneous)                          hx of Actonel in past for ~ 5 years                Prolia #1  Jan 25, 2021                   2.5 cm R thyroid nodule - FNAB WPA: benign 2020             (MGUS) 2/2022 [facility]   IgM kappa    M spike 11.1 %             (scoliosis: X-ray [facility] 11/2018 neg compression;  2020 can't r/o L3)             (spinal stenosis)  Receives epidurals at [facility]             (mother had osteoporosis)   Labs by Dr. Newsome were done in June -    Booster for Covid 19 was  Dec 1, 2021.   Received Prolia #1 in Jan and received r Prolia #3 in January   #4  ~8/25/2022   Sees Dr. Newsome every 3 months forr MGUS.  : : Constitutional:  Alert, well nourished, healthy appearance, no acute distress  Eyes:  No proptosis, no stare Thyroid: Pulmonary:  No respiratory distress, no accessory muscle use; normal rate and effort Cardiac:  Normal rate Vascular:  Endocrine:  No stigmata of Cushing's Syndrome Musculoskeletal:  Normal gait, no involuntary movements Neurology:  No tremors Affect/Mood/Psych:  Oriented x 3; normal affect, normal insight/judgement, normal mood  .  Impression:   MGUS and fragile bones on Actonel and then Prolia since January 2021 under supervision  of Dr. Newsome.   Striving to get 1000 mg of calcium a day and to maintain 25 OH vitamin D level in range of 30-40  Plan:  Updated vitamin D and other labs today and ROV six months       Nov 15, 2021     Amwell                         Prolia #1  Jan 25, 2021      PCP: Dr. Ronald Daniels p [phone] at [facility] ->  Yolanda Briceño at [facility] also             GI: Dr. Felipa Millan (colon polyp)              Hematology:   Dr. Thomas Newsome                Periodontist:  Dr. Trena Valles - [address]  ref her to ->              ENT at [facility] - was supposed to see Dr. Fracisco Lopez  before pandemic began for oral pain            .            CC: Osteopenia w/ presumptive rib fracture;  X-ray [facility] 2020: negative rib films                          hx of Actonel in past for ~ 5 years                Prolia #1  Jan 25, 2021             (MGUS)             (scoliosis: X-ray [facility] 11/2018 neg compression;  2020 can't r/o L3)             (spinal stenosis)  Receives epidurals at [facility]             (mother had osteoporosis)   Labs by Dr. Newsome in June -                  Will get Moderna booster Dec 1, 2021.   Received Prolia #1 in Jan and will be due for Prolia #3 in January July vit D was 35    Impression:  May have had an L3 compression.   On Prolia.  Has tolerated it well.    Plan:  Updated labs when she goes for next Prolia at [facility].        Jul 15, 2021        iPhone ...................smo  PCP: Dr. Ronald Daniels p [phone] at [facility] ->  Yolanda Briceño at [facility] also             GI: Dr. Felipa Millan (colon polyp)              Hematology:   Dr. Newsome              .            CC: Osteopenia w/ presumptive rib fracture;  X-ray [facility] 2020: negative rib films                          hx of Actonel in past for ~ 5 years              (scoliosis: X-ray [facility] 11/2018 neg compression;  2020 can't r/o L3)             (spinal stenosis)  Receives epidurals at [facility]             (mother had osteoporosis)   Labs by Dr. Newsome a month ago  Saw Dr. Yolanda Briceño yesterday - advised to lose weight    Impression:  It is time to receive Prolia injection. However, we need updated lab tests, including vitamin D level prior to the treatment.  Plan:  She will check with Dr. Newsome and Dr. Briceño to see if they did the vitamin D level. If not, she will stop by [facility] to get it done. ROV November.      Last Prolia Jan 25 at [facility]  Plan she will get us BMP, vit D or go to [facility] and then I will schedule next Prolai a      Oct 28, 2020    iPhone    needs lab Rx  PCP: Dr. Ronald Daniels p [phone] at [facility] ->  Yolanda Briceño at [facility] also             GI: Dr. Felipa Millan (colon polyp)              Hematology:  Dr. Kaycee Baron            .            CC: Osteopenia w/ presumptive rib fracture;  X-ray [facility] 2020: negative rib films                          hx of Actonel in past for ~ 5 years                     (MGUS)             (scoliosis: X-ray [facility] 11/2018 neg compression;  2020 can't r/o L3)             (spinal stenosis)  Receives epidurals at [facility]             (mother had osteoporosis)     Recent X-ray  at [facility]:  Impression: No acute displaced rib fracture   ***Electronically Signed *** ----------------------------------------------- VIVIEN MUIR MD              07/29/20   X-ray spine:  CLINICAL HISTORY: Osteoporosis, monoclonal gammopathy  FINDINGS:   Levoscoliosis centered on L1 is again apparent. Multilevel degenerative disc disease and productive changes are again apparent including L5-S1 disc space narrowing and mild anterolisthesis. Lateral projection due to the scoliotic deformity is limited in the evaluation of vertebral height. The L3 level cannot be adequately assessed but there decrease in height is suggested. Otherwise the vertebra are maintained in height shape and alignment.  IMPRESSION: Scoliotic deformity and diffuse degenerative changes and discogenic disease again noted. There may be interval loss in height of the L3 vertebral body.  ***Electronically Signed *** ----------------------------------------------- Bryan Hughes MD              10/06/20 0925  Dictated on 10/05/20   Imp/Plan: 73 yo being followed by Dr. Baron - mgus Rib no longer bothers her; 10/2020:  negative rib films   2020 bone density [facility] T scores:  LS -0.9;  TH -1.7;  FN -2.3   Carries diagnosis of spinal stenosis, scoliosis Has scoliosis, ? L3 compression - hard to tell b/o the scoliosis - on recent X-ray spine (neg in 2018) Will see Dr. Baron soon  - vitamin D level has been in good range. Gets epidurals at [facility] - last several years ago.     ROV six months.   F/U on L3.    Depending on findings, follow up bone density, GFR, can be considered for an antiresorptive in future.  Jul 14, 2020     PCP: Dr. Ronald Daniels p [phone] at [facility] ->  Yolanda Briceño at [facility] also             GI: Dr. Felipa Millan (colon polyp)              Hematology:  Dr. Kaycee Baron            .            CC: Osteopenia w/ presumptive rib fracture   hx of Actonel in past for ~ 5 years              (scoliosis)             (spinal stenosis)  Receives epidurals at [facility]             (mother had osteoporosis)   Her son, DM1, PGY 2 at [employer].  Doing well but busy.  Her most recent bone density at [facility] at [address] [place] [address]  AP spine T 0 lateral spine T + 2.2 FN T -2.1 TH T -1.6  Impression:  Her usual facility for bone density has retired.  So next BD will be at [facility] at very end of September or early October.    In the meantime, I will ask her to have X-ray spine (to check for occult compression fracture), and defer to Dr. Baron regarding any strategies for bone strength while evaluation of monoclonal protein continues.  She still has discomfort where she previously injured a rib, and I will ask her to have rib films when she goes for the spine X-ray, likely on the same day near end of July that she has Hematology follow up visit.  ROV here in October Jan 16, 2020  PCP: Dr. Ronald Daniels p [phone] at [facility] ->  Yolanda Briceño at [facility] also             GI: Dr. Felipa Millan (colon polyp)              Hematology:  Dr. Kaycee Baron            .            CC: Osteopenia w/ rib frx             (scoliosis)             (spinal stenosis)              (mother had osteoporosis)   Going for US thyroid at [facility] per Dr. Briceño.   Seeing Dr. Baron regarding monoclonal protein.  Haematologica. 2018 May; 103(5): 753-754. doi: [email or web address] PMCID: [id number] PMID: [id number] Denosumab for bone lesions in multiple myeloma - what is its value? Rafael Pate       Sept 3, 2019 PCP: Dr. Ronald Daniels p [phone] at [facility]              GI: Dr. Felipa Millan (colon polyp)            .            CC: Osteopenia w/ rib frx             (scoliosis)             (spinal stenosis)              (mother had osteoporosis)   72 yo lives in [place].   A close neighbor is a senior physician advisor.   Her son has Type 1 and is an intern in medicine at [employer].     Impression:  Evidence of MGUS.      Plan:   Before deciding on next steps for treatment of osteoporosis, she will see Hematology at [facility]. Contact made and Hematology Department will contact her.    Kanwal Marquez Korey Law   April 1, 2019   .            PCP: Dr. Ronald Daniels p [phone]             GI: Dr. Felipa Millan (colon polyp)            .            CC: Osteopenia w/ rib frx             (scoliosis)             (mother had osteoporosis)   In course of evaluation for rib fractures, noted to have osteopenia.   Lab studies show monoclonal IgM kappa.   Beta-2 microglobulin, ESR WNL   Plan:  Repeat labs at [facility] before [place] - including markers of bone turnover. Will then ask advice of Hematology.                 .   January 2, 2018  CC:  Osteopenia, rib fracture  24 hour urine for calcium performed at [facility].- within normal limits.  Blood tests from  10/29/2018 performed at [facility] included TSH 2.65 PTH 49 gliadin IgG/IgA - neg osteocalcin 19 CTXs  319 IgM Kappa Band + WBC 3.5 Polys 50 Lymphs 32 Monos 17  Hct 38  X-ray spine 11/1/2018 No fracture   Impression:   Rib fracture from minimal trauma; however, no major fractures or compression fractures. Not in a rush to restart pharmacologic intervention.  Plan:  Will follow up on recent labs, repeat CBC etc. ROV in several weeks and likely input from Dr. Baron or Faustino.      Previous notes from [employer] appended below.    October 26, 2018            .            PCP: Dr. Ronald Daniels p [phone]             GI: Dr. Felipa Millan (colon polyp)            .            CC: Osteoporosis             (mother had osteoporosis)             .            71 yo  mother of 29 yo son.            Retired .             Medications include            desloratadine 5 mg            diclofenac topical gel            ipratropium bromide 0.03 % nasal spray            Miralax poser            Nexium 20 mg apsule            resperidone 0.25 mg tabs            sertraline 100 mg tabs            simvastatin 40 mg daiy .            .            Told of PCOS in her 20s when she had painful menses.            Gyn was Dr. Harjeet Petty who asked her what temperature she preferred and she was referred to Dr. Gris Landis and was told of PCOS. She was treated with Motrin with benefit.             .            Has been getting bone density tests by Dr. Gris Nactigal. She was told of good bone density.            In 2013 the bone density was very good.             .            Recently told of osteopenia; however, she was advised that she is declining and her mother had osteoporosis.             .            She has also been told of spinal stenosis, scoliosis, and arthritis.             Goes for epidurals by Dr. Galloway at [facility] - for back pain about 3very 3 months with benefit. Last was over a year ago.             .            She treats the pain with extra strength Tyelonol, two BID with benefit.            The radiologist who performed the most recent bone density test at [address] [place] [address] noted she hasd lost 16% over the past 10 years.

## 2025-05-08 NOTE — HISTORY OF PRESENT ILLNESS
[FreeTextEntry1] : May 07, 2025    in person        PCP: Dr. Yolanda Briceño at [facility] -> echocardiogram               GI: Dr. Felipa Millan (colon polyp)              Hematology:   Dr. Thomas Newsome  - [facility] in [place]               Periodontist:  Dr. Trena Valles - [address]  ref her to ->              ENT at [facility] - was supposed to see Dr. Fracisco Lopez  before pandemic began for oral pain            .            CC: Osteopenia w/ presumptive rib fracture;  X-ray [facility] 2020: negative rib films  (spontaneous)                          hx of Actonel in past for ~ 5 years                Prolia #1  Jan 25, 2021                   2.5 cm R thyroid nodule - FNAB WPA: benign 2020             (MGUS) 2/2022 [facility]   IgM kappa    M spike 0.9 g/dl (4/2021) - sees Dr. Newsome             (scoliosis: X-ray [facility] 11/2018 neg compression;  2020 can't r/o L3)             (spinal stenosis)  Receives epidurals at [facility]             (mother had osteoporosis)   76 yo  neighbor of Dr. Farmer, visits for osteoporosis, thyroid nodule and previously brought in  [facility] note and labs from Dr. Newsome (whom she sees for MGUS.   She has previously been  Dr. Mcgill advised her A1c slightly elevated. She feels well.     She reports that her oral surgeon, Isaias Diggs performed a root canal and anticipates extraction of a tooth on July 28, 2025  He has been to Dr. Law for evaluation of a chronic cough, was told of a senstivie throat and tried on dietary alterations.  Now seeing Morenita Davis at [facility] and referred to a speech therapist whom she will see tomorrow.  She has been noting shoulder pain, especially on the left, and is seein a physiatrist at [facility].  In the past cortisone injections helped, but apparently she is not eligible for another inejction - so she has been referred to PT   After a mammogram and biopsy she has been undregoing further evaluation and surveillance and has an appointment to see an NP at [facility].  In January 2025 she feel on her face - no fractures 3/15/2025 T scores:  FN -2.1;  TH -1.1, LS -1.4 and FA -2.4 She is due for next Prolia in the near future (and US thyroid)    # Thyroid nodule - most recent US at [facility] 11/29/22:  "IMPRESSION:   Asymmetrically enlarged heterogeneous thyroid.  Dominant 2.4 cm left-sided TIRAD 3 nodule and additional subcentimeter nodules as above.  Advise follow-up thyroid ultrasound in one year."  Labs by Dr. Newsome were done in June -    Booster for Covid 19 was  Dec 1, 2021.   Received Prolia #1 in Jan and received r Prolia #3 in January   #4  ~8/25/2022   On May 7, 2025 Rx for updated thyroid US requested.   : : Constitutional:  Alert, well nourished, healthy appearance, no acute distress  Eyes:  No proptosis, no stare Thyroid: Pulmonary:  No respiratory distress, no accessory muscle use; normal rate and effort Cardiac:  Normal rate Vascular:  Endocrine:  No stigmata of Cushings Syndrome Musculoskeletal:  Normal gait, no involuntary movements Neurology:  No tremors Affect/Mood/Psych:  Oriented x 3; normal affect, normal insight/judgement, normal mood  . Impression/Plan:  As above - she has alot of medical issues to attend to.      Dec 06, 2024    in person.    Her son has DM1, is onc  fellow at [place], [place]    Next Prolia Jan 27, 2025 at [facility].    PCP: Dr. Yolanda Briceño at [facility] -> echocardiogram               GI: Dr. Felipa Millan (colon polyp)              Hematology:   Dr. Thomas Newsome  - [facility] in [place]               Periodontist:  Dr. Trena Valles - [address]  ref her to ->              ENT at [facility] - was supposed to see Dr. Fracisco Lopez  before pandemic began for oral pain            .            CC: Osteopenia w/ presumptive rib fracture;  X-ray [facility] 2020: negative rib films  (spontaneous)                          hx of Actonel in past for ~ 5 years                Prolia #1  Jan 25, 2021                   2.5 cm R thyroid nodule - FNAB WPA: benign 2020             (MGUS) 2/2022 [facility]   IgM kappa    M spike 0.9 g/dl (4/2021) - sees Dr. Newsome             (scoliosis: X-ray [facility] 11/2018 neg compression;  2020 can't r/o L3)             (spinal stenosis)  Receives epidurals at [facility]             (mother had osteoporosis)   76 yo visits for osteoporosis (Prolia # 1 in January 2021) , thyroid nodule and kindly brings in most recent [facility] note and labs from Dr. Mercedez Mcgill advised her A1c slightly elevated. She feels well.    # Thyroid nodule - most recent US at [facility] 11/29/22:  "IMPRESSION:   Asymmetrically enlarged heterogeneous thyroid.  Dominant 2.4 cm left-sided TIRAD 3 nodule and additional subcentimeter nodules as above.  Advise follow-up thyroid ultrasound in one year."  Labs by Dr. Newsome were done in June -    Booster for Covid 19 was  Dec 1, 2021.   Received Prolia #1 in Jan and received r Prolia #3 in January   #4  ~8/25/2022      : : Constitutional:  Alert, well nourished, healthy appearance, no acute distress  Eyes:  No proptosis, no stare Thyroid: Pulmonary:  No respiratory distress, no accessory muscle use; normal rate and effort Cardiac:  Normal rate Vascular:  Endocrine:  No stigmata of Cushings Syndrome Musculoskeletal:  Normal gait, no involuntary movements Neurology:  No tremors Affect/Mood/Psych:  Oriented x 3; normal affect, normal insight/judgement, normal mood  . Impression:  Doing well on Prolia for osteoporosis. Thyroid nodule remains under surveillance.       Jun 05, 2024    in person   neighbor of Dr. GILBERT  PCP: Dr. Yolanda Briceño at [facility] -> echocardiogram               GI: Dr. Felipa Millan (colon polyp)              Hematology:   Dr. Thomas Newsome  - [facility] in [place]               Periodontist:  Dr. Trena Valles - [address]  ref her to ->              ENT at [facility] - was supposed to see Dr. Fracisco Lopez  before pandemic began for oral pain            .            CC: Osteopenia w/ presumptive rib fracture;  X-ray [facility] 2020: negative rib films  (spontaneous)                          hx of Actonel in past for ~ 5 years                Prolia #1  Jan 25, 2021                   2.5 cm R thyroid nodule - FNAB WPA: benign 2020             (MGUS) 2/2022 [facility]   IgM kappa    M spike 0.9 g/dl (4/2021) - sees Dr. Newsome             (scoliosis: X-ray [facility] 11/2018 neg compression;  2020 can't r/o L3)             (spinal stenosis)  Receives epidurals at [facility]             (mother had osteoporosis)   76 yo kindly brings note from Dr. Yolanda Briceño from 1/4/2024   # Thyroid nodule -US at [facility] 11/29/22:    (7/2024:  Interval stability    "IMPRESSION:   Asymmetrically enlarged heterogeneous thyroid.  Dominant 2.4 cm left-sided TIRAD 3 nodule and additional subcentimeter nodules as above.  Advise follow-up thyroid ultrasound in one year."  Labs by Dr. Newsome were done in June -    Booster for Covid 19 was  Dec 1, 2021.   Received Prolia #1 in Jan and received r Prolia #3 in January   #4  ~8/25/2022   [facility] labs 11/28/22 vit D 52  : : Constitutional:  Alert, well nourished, healthy appearance, no acute distress  Eyes:  No proptosis, no stare Thyroid: normal to palpatin Pulmonary:  No respiratory distress, no accessory muscle use; normal rate and effort Cardiac:  Normal rate Vascular:  Endocrine:  No stigmata of Cushings Syndrome Musculoskeletal:  Normal gait, no involuntary movements Neurology:  No tremors Affect/Mood/Psych:  Oriented x 3; normal affect, normal insight/judgement, normal mood  . Imp/Plan: Follow up US thyroid at [facility] to monitor dominant nodule. Continue Prola. Labs today. ROV January Jan 05, 2024     in person  PCP: Dr. Yolanda Briceño at [facility]              GI: Dr. Felipa Millan (colon polyp)              Hematology:   Dr. Thomas Newsome  - [facility] in [place]               Periodontist:  Dr. Trena Valles - [address]  ref her to ->              ENT at [facility] - was supposed to see Dr. Fracisco Lopez  before pandemic began for oral pain            .            CC: Osteopenia w/ presumptive rib fracture;  X-ray [facility] 2020: negative rib films  (spontaneous)                          hx of Actonel in past for ~ 5 years                Prolia #1  Jan 25, 2021                   2.5 cm R thyroid nodule - FNAB WPA: benign 2020             (MGUS) 2/2022 [facility]   IgM kappa    M spike 0.9 g/dl (4/2021) - sees Dr. Newsome             (scoliosis: X-ray [facility] 11/2018 neg compression;  2020 can't r/o L3)             (spinal stenosis)  Receives epidurals at [facility]             (mother had osteoporosis)   76 yo kindly brings note from Dr. Yolanda Briceño from 1/4/2024   # Thyroid nodule - most recent US at [facility] 11/29/22:  "IMPRESSION:   Asymmetrically enlarged heterogeneous thyroid.  Dominant 2.4 cm left-sided TIRAD 3 nodule and additional subcentimeter nodules as above.  Advise follow-up thyroid ultrasound in one year."  Labs by Dr. Newsome were done in June -    Booster for Covid 19 was  Dec 1, 2021.   Received Prolia #1 in Jan and received r Prolia #3 in January   #4  ~8/25/2022   [facility] labs 11/28/22 vit D 52  : : Constitutional:  Alert, well nourished, healthy appearance, no acute distress  Eyes:  No proptosis, no stare Thyroid: Pulmonary:  No respiratory distress, no accessory muscle use; normal rate and effort Cardiac:  Normal rate Vascular:  Endocrine:  No stigmata of Cushings Syndrome Musculoskeletal:  Normal gait, no involuntary movements Neurology:  No tremors Affect/Mood/Psych:  Oriented x 3; normal affect, normal insight/judgement, normal mood  . Imp:  On Prolia for osteoporosis .  Next Prolia Jan 19, 2024  Plan:  Vit D, BMP today         May 22, 2023    in person  PCP: Dr. Yolanda Briceño at [facility]              GI: Dr. Felipa Millan (colon polyp)              Hematology:   Dr. Thomas Newsome  - [facility] in [place]               Periodontist:  Dr. Trena Valles - [address]  ref her to ->              ENT at [facility] - was supposed to see Dr. Fracisco Lopez  before pandemic began for oral pain            .            CC: Osteopenia w/ presumptive rib fracture;  X-ray [facility] 2020: negative rib films  (spontaneous)                          hx of Actonel in past for ~ 5 years                Prolia #1  Jan 25, 2021                   2.5 cm R thyroid nodule - FNAB WPA: benign 2020             (MGUS) 2/2022 [facility]   IgM kappa    M spike 0.9 g/dl (4/2021) - sees Dr. Newsome             (scoliosis: X-ray [facility] 11/2018 neg compression;  2020 can't r/o L3)             (spinal stenosis)  Receives epidurals at [facility]             (mother had osteoporosis)   # Thyroid nodule - most recent US at [facility] 11/29/22:  "IMPRESSION:   Asymmetrically enlarged heterogeneous thyroid.  Dominant 2.4 cm left-sided TIRAD 3 nodule and additional subcentimeter nodules as above.  Advise follow-up thyroid ultrasound in one year."  Labs by Dr. Newsome were done in June -    Booster for Covid 19 was  Dec 1, 2021.   Received Prolia #1 in Jan and received r Prolia #3 in January   #4  ~8/25/2022   [facility] labs 11/28/22 vit D 52  Most recent Prolia injection was Nov 29, 2022   : : Constitutional:  Alert, well nourished, healthy appearance, no acute distress  Eyes:  No proptosis, no stare Thyroid:  normal to palpation Pulmonary:  No respiratory distress, no accessory muscle use; normal rate and effort Cardiac:  Normal rate Vascular:  Endocrine:  No stigmata of Cushing's Syndrome Musculoskeletal:  Normal gait, no involuntary movements Neurology:  No tremors Affect/Mood/Psych:  Oriented x 3; normal affect, normal insight/judgement, normal mood  .      Imp:  Due for next Prolia injection end of May, but she will be on trip until June 7, 2024 so she will schedule next Prolia for after June 7 and go for labs shortly before that.       Jan 17, 2023      iPhone     reviewed US thy (prev bx), x-ray spine, bone density, Noe notes MGUS, next Prolia  PCP: Dr. Yolanda Briceño at [facility]              GI: Dr. Felipa Millan (colon polyp)              Hematology:   Dr. Thomas Newsome  - [facility] in [place]               Periodontist:  Dr. Trena Valles - [address]  ref her to ->              ENT at [facility] - was supposed to see Dr. Fracisco Lopez  before pandemic began for oral pain            .            CC: Osteopenia w/ presumptive rib fracture;  X-ray [facility] 2020: negative rib films  (spontaneous)                          hx of Actonel in past for ~ 5 years                Prolia #1  Jan 25, 2021                   2.5 cm R thyroid nodule - FNAB WPA: benign 2020             (MGUS) 2/2022 [facility]   IgM kappa    M spike 0.9 g/dl (4/2021) - sees Dr. Newsoem             (scoliosis: X-ray [facility] 11/2018 neg compression;  2020 can't r/o L3)             (spinal stenosis)  Receives epidurals at [facility]             (mother had osteoporosis)   Labs by Dr. Newsome were done in June -    Booster for Covid 19 was  Dec 1, 2021.   Received Prolia #1 in Jan and received r Prolia #3 in January   #4  ~8/25/2022   [facility] labs 11/28/22 vit D 52   Impression:  Tolerating Prolia without problem Bone density results and trajectory reviewed. X-ray: mild exaggeration of the normal thoracic kyphosis related to disc space narrowing.    Plan:  Continue Prolia for now.      Nov 28, 2022     in person  PCP: Dr. Yolanda Briceño at [facility]              GI: Dr. Felipa Millan (colon polyp)              Hematology:   Dr. Thomas Newsome  - [facility] in [place]               Periodontist:  Dr. Trena Valles - [address]  ref her to ->              ENT at [facility] - was supposed to see Dr. Fracisco Lopez  before pandemic began for oral pain            .            CC: Osteopenia w/ presumptive rib fracture;  X-ray [facility] 2020: negative rib films  (spontaneous)                          hx of Actonel in past for ~ 5 years                Prolia #1  Jan 25, 2021                   2.5 cm R thyroid nodule - FNAB WPA: benign 2020             (MGUS) 2/2022 [facility]   IgM kappa    M spike 11.1 %             (scoliosis: X-ray [facility] 11/2018 neg compression;  2020 can't r/o L3)             (spinal stenosis)  Receives epidurals at [facility]             (mother had osteoporosis)   Labs by Dr. Newsome were done in June -    Booster for Covid 19 was  Dec 1, 2021.   Received Prolia #1 in Jan and received r Prolia #3 in January   #4  ~8/25/2022   Sees Dr. Newsome every 3 months for MGUS.  Taking caltrate 600 + D once a day Centrum silver vit D 2000 iu (50 mcg) daily) Prolia every 6 months. - at [facility] as ordered by .     Apr 26, 2022     in person  PCP: Dr. Yolanda Briceño at [facility]              GI: Dr. Felipa Millan (colon polyp)              Hematology:   Dr. Thomas Newsome  - [facility] in [place]               Periodontist:  Dr. Trena Valles - [address]  ref her to ->              ENT at [facility] - was supposed to see Dr. Fracisco Lopez  before pandemic began for oral pain            .            CC: Osteopenia w/ presumptive rib fracture;  X-ray [facility] 2020: negative rib films  (spontaneous)                          hx of Actonel in past for ~ 5 years                Prolia #1  Jan 25, 2021                   2.5 cm R thyroid nodule - FNAB WPA: benign 2020             (MGUS) 2/2022 [facility]   IgM kappa    M spike 11.1 %             (scoliosis: X-ray [facility] 11/2018 neg compression;  2020 can't r/o L3)             (spinal stenosis)  Receives epidurals at [facility]             (mother had osteoporosis)   Labs by Dr. Newsome were done in June -    Booster for Covid 19 was  Dec 1, 2021.   Received Prolia #1 in Jan and received r Prolia #3 in January   #4  ~8/25/2022   Sees Dr. Newsome every 3 months forr MGUS.  : : Constitutional:  Alert, well nourished, healthy appearance, no acute distress  Eyes:  No proptosis, no stare Thyroid: Pulmonary:  No respiratory distress, no accessory muscle use; normal rate and effort Cardiac:  Normal rate Vascular:  Endocrine:  No stigmata of Cushing's Syndrome Musculoskeletal:  Normal gait, no involuntary movements Neurology:  No tremors Affect/Mood/Psych:  Oriented x 3; normal affect, normal insight/judgement, normal mood  .  Impression:   MGUS and fragile bones on Actonel and then Prolia since January 2021 under supervision  of Dr. Newsome.   Striving to get 1000 mg of calcium a day and to maintain 25 OH vitamin D level in range of 30-40  Plan:  Updated vitamin D and other labs today and ROV six months       Nov 15, 2021     Amwell                         Prolia #1  Jan 25, 2021      PCP: Dr. Ronald Daniels p [phone] at [facility] ->  Yolanda Briceño at [facility] also             GI: Dr. Felipa Millan (colon polyp)              Hematology:   Dr. Thomas Newsome                Periodontist:  Dr. Trena Valles - [address]  ref her to ->              ENT at [facility] - was supposed to see Dr. Fracisco Lopez  before pandemic began for oral pain            .            CC: Osteopenia w/ presumptive rib fracture;  X-ray [facility] 2020: negative rib films                          hx of Actonel in past for ~ 5 years                Prolia #1  Jan 25, 2021             (MGUS)             (scoliosis: X-ray [facility] 11/2018 neg compression;  2020 can't r/o L3)             (spinal stenosis)  Receives epidurals at [facility]             (mother had osteoporosis)   Labs by Dr. Newsome in June -                  Will get Moderna booster Dec 1, 2021.   Received Prolia #1 in Jan and will be due for Prolia #3 in January July vit D was 35    Impression:  May have had an L3 compression.   On Prolia.  Has tolerated it well.    Plan:  Updated labs when she goes for next Prolia at [facility].        Jul 15, 2021        iPhone ...................smo  PCP: Dr. Ronald Daniels p [phone] at [facility] ->  Yolanda Briceño at [facility] also             GI: Dr. Felipa Millan (colon polyp)              Hematology:   Dr. Newsome              .            CC: Osteopenia w/ presumptive rib fracture;  X-ray [facility] 2020: negative rib films                          hx of Actonel in past for ~ 5 years              (scoliosis: X-ray [facility] 11/2018 neg compression;  2020 can't r/o L3)             (spinal stenosis)  Receives epidurals at [facility]             (mother had osteoporosis)   Labs by Dr. Newsome a month ago  Saw Dr. Yolanda Briceño yesterday - advised to lose weight    Impression:  It is time to receive Prolia injection. However, we need updated lab tests, including vitamin D level prior to the treatment.  Plan:  She will check with Dr. Newsome and Dr. Briceño to see if they did the vitamin D level. If not, she will stop by [facility] to get it done. ROV November.      Last Prolia Jan 25 at [facility]  Plan she will get us BMP, vit D or go to [facility] and then I will schedule next Prolai a      Oct 28, 2020    iPhone    needs lab Rx  PCP: Dr. Ronald Daniels p [phone] at [facility] ->  Yolanda Briceño at [facility] also             GI: Dr. Felipa Millan (colon polyp)              Hematology:  Dr. Kaycee Baron            .            CC: Osteopenia w/ presumptive rib fracture;  X-ray [facility] 2020: negative rib films                          hx of Actonel in past for ~ 5 years                     (MGUS)             (scoliosis: X-ray [facility] 11/2018 neg compression;  2020 can't r/o L3)             (spinal stenosis)  Receives epidurals at [facility]             (mother had osteoporosis)     Recent X-ray  at [facility]:  Impression: No acute displaced rib fracture   ***Electronically Signed *** ----------------------------------------------- VIVIEN MUIR MD              07/29/20   X-ray spine:  CLINICAL HISTORY: Osteoporosis, monoclonal gammopathy  FINDINGS:   Levoscoliosis centered on L1 is again apparent. Multilevel degenerative disc disease and productive changes are again apparent including L5-S1 disc space narrowing and mild anterolisthesis. Lateral projection due to the scoliotic deformity is limited in the evaluation of vertebral height. The L3 level cannot be adequately assessed but there decrease in height is suggested. Otherwise the vertebra are maintained in height shape and alignment.  IMPRESSION: Scoliotic deformity and diffuse degenerative changes and discogenic disease again noted. There may be interval loss in height of the L3 vertebral body.  ***Electronically Signed *** ----------------------------------------------- Bryan Hughes MD              10/06/20 0925  Dictated on 10/05/20   Imp/Plan: 71 yo being followed by Dr. Baron - mgus Rib no longer bothers her; 10/2020:  negative rib films   2020 bone density [facility] T scores:  LS -0.9;  TH -1.7;  FN -2.3   Carries diagnosis of spinal stenosis, scoliosis Has scoliosis, ? L3 compression - hard to tell b/o the scoliosis - on recent X-ray spine (neg in 2018) Will see Dr. Baron soon  - vitamin D level has been in good range. Gets epidurals at [facility] - last several years ago.     ROV six months.   F/U on L3.    Depending on findings, follow up bone density, GFR, can be considered for an antiresorptive in future.  Jul 14, 2020     PCP: Dr. Ronald Daniels p [phone] at [facility] ->  Yolanda Briceño at [facility] also             GI: Dr. Felipa Millan (colon polyp)              Hematology:  Dr. Kaycee Baron            .            CC: Osteopenia w/ presumptive rib fracture   hx of Actonel in past for ~ 5 years              (scoliosis)             (spinal stenosis)  Receives epidurals at [facility]             (mother had osteoporosis)   Her son, DM1, PGY 2 at [employer].  Doing well but busy.  Her most recent bone density at [facility] at [address] [place] [address]  AP spine T 0 lateral spine T + 2.2 FN T -2.1 TH T -1.6  Impression:  Her usual facility for bone density has retired.  So next BD will be at [facility] at very end of September or early October.    In the meantime, I will ask her to have X-ray spine (to check for occult compression fracture), and defer to Dr. Baron regarding any strategies for bone strength while evaluation of monoclonal protein continues.  She still has discomfort where she previously injured a rib, and I will ask her to have rib films when she goes for the spine X-ray, likely on the same day near end of July that she has Hematology follow up visit.  ROV here in October Jan 16, 2020  PCP: Dr. Ronald Daniels p [phone] at [facility] ->  Yolanda Briceño at [facility] also             GI: Dr. Felipa Millan (colon polyp)              Hematology:  Dr. Kaycee Baron            .            CC: Osteopenia w/ rib frx             (scoliosis)             (spinal stenosis)              (mother had osteoporosis)   Going for US thyroid at [facility] per Dr. Briceño.   Seeing Dr. Baron regarding monoclonal protein.  Haematologica. 2018 May; 103(5): 753-754. doi: [email or web address] PMCID: [id number] PMID: [id number] Denosumab for bone lesions in multiple myeloma - what is its value? Rafael Pate       Sept 3, 2019 PCP: Dr. Ronald Daniels p [phone] at [facility]              GI: Dr. Felipa Millan (colon polyp)            .            CC: Osteopenia w/ rib frx             (scoliosis)             (spinal stenosis)              (mother had osteoporosis)   72 yo lives in [place].   A close neighbor is a senior physician advisor.   Her son has Type 1 and is an intern in medicine at [employer].     Impression:  Evidence of MGUS.      Plan:   Before deciding on next steps for treatment of osteoporosis, she will see Hematology at [place]. Contact made and Hematology Department will contact her.    Kanwal Marquez Korey Law   April 1, 2019   .            PCP: Dr. Ronald Daniels p [phone]             GI: Dr. Felipa Millan (colon polyp)            .            CC: Osteopenia w/ rib frx             (scoliosis)             (mother had osteoporosis)   In course of evaluation for rib fractures, noted to have osteopenia.   Lab studies show monoclonal IgM kappa.   Beta-2 microglobulin, ESR WNL   Plan:  Re[facility] labs [facility] [place] [facility] [employer] - including markers of bone turnover. Will then ask advice of Hematology.                 .   January 2, 2018  CC:  Osteopenia, rib fracture  24 hour urine for calcium performed at [facility].- within normal limits.  Blood tests from  10/29/2018 performed at [facility] included TSH 2.65 PTH 49 gliadin IgG/IgA - neg osteocalcin 19 CTXs  319 IgM Kappa Band + WBC 3.5 Polys 50 Lymphs 32 Monos 17  Hct 38  X-ray spine 11/1/2018 No fracture   Impression:   Rib fracture from minimal trauma; however, no major fractures or compression fractures. Not in a rush to restart pharmacologic intervention.  Plan:  Will follow up on recent labs, repeat CBC etc. ROV in several weeks and likely input from Dr. Baron or Faustino.      Previous notes from [employer] appended below.    October 26, 2018            .            PCP: Dr. Ronald Daniels p [phone]             GI: Dr. Felipa Millan (colon polyp)            .            CC: Osteoporosis             (mother had osteoporosis)             .            69 yo  mother of 27 yo son.            Retired .             Medications include            desloratadine 5 mg            diclofenac topical gel            ipratropium bromide 0.03 % nasal spray            Miralax poser            Nexium 20 mg apsule            resperidone 0.25 mg tabs            sertraline 100 mg tabs            simvastatin 40 mg daiy .            .            Told of PCOS in her 20s when she had painful menses.            Gyn was Dr. Harjeet Petty who asked her what temperature she preferred and she was referred to Dr. Gris Landis and was told of PCOS. She was treated with Motrin with benefit.             .            Has been getting bone density tests by Dr. Gris Nactigal. She was told of good bone density.            In 2013 the bone density was very good.             .            Recently told of osteopenia; however, she was advised that she is declining and her mother had osteoporosis.             .            She has also been told of spinal stenosis, scoliosis, and arthritis.             Goes for epidurals by Dr. Galloway at [facility] - for back pain about 3very 3 months with benefit. Last was over a year ago.             .            She treats the pain with extra strength Tyelonol, two BID with benefit.            The radiologist who performed the most recent bone density test at [address] [place] [address] noted she hasd lost 16% over the past 10 years.